# Patient Record
Sex: MALE | Race: WHITE | NOT HISPANIC OR LATINO | Employment: UNEMPLOYED | URBAN - METROPOLITAN AREA
[De-identification: names, ages, dates, MRNs, and addresses within clinical notes are randomized per-mention and may not be internally consistent; named-entity substitution may affect disease eponyms.]

---

## 2017-09-15 ENCOUNTER — RESOLUTE PROFESSIONAL BILLING HOSPITAL PROF FEE (OUTPATIENT)
Dept: HOSPITALIST | Facility: MEDICAL CENTER | Age: 37
End: 2017-09-15
Payer: COMMERCIAL

## 2017-09-15 ENCOUNTER — APPOINTMENT (OUTPATIENT)
Dept: RADIOLOGY | Facility: MEDICAL CENTER | Age: 37
DRG: 087 | End: 2017-09-15
Attending: EMERGENCY MEDICINE
Payer: COMMERCIAL

## 2017-09-15 ENCOUNTER — HOSPITAL ENCOUNTER (INPATIENT)
Facility: MEDICAL CENTER | Age: 37
LOS: 7 days | DRG: 087 | End: 2017-09-22
Attending: EMERGENCY MEDICINE | Admitting: SURGERY
Payer: COMMERCIAL

## 2017-09-15 ENCOUNTER — APPOINTMENT (OUTPATIENT)
Dept: RADIOLOGY | Facility: MEDICAL CENTER | Age: 37
DRG: 087 | End: 2017-09-15
Attending: SURGERY
Payer: COMMERCIAL

## 2017-09-15 DIAGNOSIS — S02.119A CLOSED FRACTURE OF RIGHT SIDE OF OCCIPITAL BONE, UNSPECIFIED OCCIPITAL FRACTURE TYPE, INITIAL ENCOUNTER (HCC): ICD-10-CM

## 2017-09-15 DIAGNOSIS — Z53.09 CONTRAINDICATION TO DEEP VEIN THROMBOSIS (DVT) PROPHYLAXIS: ICD-10-CM

## 2017-09-15 DIAGNOSIS — S06.5X1A TRAUMATIC SUBDURAL HEMATOMA WITH LOSS OF CONSCIOUSNESS OF 30 MINUTES OR LESS, INITIAL ENCOUNTER (HCC): ICD-10-CM

## 2017-09-15 PROBLEM — T14.90XA TRAUMA: Status: ACTIVE | Noted: 2017-09-15

## 2017-09-15 LAB
ABO GROUP BLD: NORMAL
ABO GROUP BLD: NORMAL
ALBUMIN SERPL BCP-MCNC: 4.4 G/DL (ref 3.2–4.9)
ALBUMIN/GLOB SERPL: 1.6 G/DL
ALP SERPL-CCNC: 56 U/L (ref 30–99)
ALT SERPL-CCNC: 21 U/L (ref 2–50)
ANION GAP SERPL CALC-SCNC: 13 MMOL/L (ref 0–11.9)
APTT PPP: 24.2 SEC (ref 24.7–36)
AST SERPL-CCNC: 26 U/L (ref 12–45)
BILIRUB SERPL-MCNC: 1.3 MG/DL (ref 0.1–1.5)
BLD GP AB SCN SERPL QL: NORMAL
BUN SERPL-MCNC: 13 MG/DL (ref 8–22)
CALCIUM SERPL-MCNC: 9.4 MG/DL (ref 8.5–10.5)
CHLORIDE SERPL-SCNC: 106 MMOL/L (ref 96–112)
CO2 SERPL-SCNC: 17 MMOL/L (ref 20–33)
CREAT SERPL-MCNC: 0.79 MG/DL (ref 0.5–1.4)
ERYTHROCYTE [DISTWIDTH] IN BLOOD BY AUTOMATED COUNT: 37.1 FL (ref 35.9–50)
ETHANOL BLD-MCNC: 0.01 G/DL
GFR SERPL CREATININE-BSD FRML MDRD: >60 ML/MIN/1.73 M 2
GLOBULIN SER CALC-MCNC: 2.7 G/DL (ref 1.9–3.5)
GLUCOSE BLD-MCNC: 131 MG/DL (ref 65–99)
GLUCOSE BLD-MCNC: 137 MG/DL (ref 65–99)
GLUCOSE SERPL-MCNC: 136 MG/DL (ref 65–99)
HCT VFR BLD AUTO: 42.3 % (ref 42–52)
HGB BLD-MCNC: 15 G/DL (ref 14–18)
INR PPP: 1.04 (ref 0.87–1.13)
MCH RBC QN AUTO: 28.7 PG (ref 27–33)
MCHC RBC AUTO-ENTMCNC: 35.5 G/DL (ref 33.7–35.3)
MCV RBC AUTO: 81 FL (ref 81.4–97.8)
PLATELET # BLD AUTO: 300 K/UL (ref 164–446)
PMV BLD AUTO: 9.8 FL (ref 9–12.9)
POTASSIUM SERPL-SCNC: 3.9 MMOL/L (ref 3.6–5.5)
PROT SERPL-MCNC: 7.1 G/DL (ref 6–8.2)
PROTHROMBIN TIME: 13.9 SEC (ref 12–14.6)
RBC # BLD AUTO: 5.22 M/UL (ref 4.7–6.1)
RH BLD: NORMAL
SODIUM SERPL-SCNC: 136 MMOL/L (ref 135–145)
WBC # BLD AUTO: 16.6 K/UL (ref 4.8–10.8)

## 2017-09-15 PROCEDURE — 86900 BLOOD TYPING SEROLOGIC ABO: CPT

## 2017-09-15 PROCEDURE — 96374 THER/PROPH/DIAG INJ IV PUSH: CPT

## 2017-09-15 PROCEDURE — 71010 DX-CHEST-LIMITED (1 VIEW): CPT

## 2017-09-15 PROCEDURE — 700102 HCHG RX REV CODE 250 W/ 637 OVERRIDE(OP): Performed by: SURGERY

## 2017-09-15 PROCEDURE — 85027 COMPLETE CBC AUTOMATED: CPT

## 2017-09-15 PROCEDURE — 700105 HCHG RX REV CODE 258: Performed by: SURGERY

## 2017-09-15 PROCEDURE — 86850 RBC ANTIBODY SCREEN: CPT

## 2017-09-15 PROCEDURE — 72125 CT NECK SPINE W/O DYE: CPT

## 2017-09-15 PROCEDURE — 82962 GLUCOSE BLOOD TEST: CPT

## 2017-09-15 PROCEDURE — 99291 CRITICAL CARE FIRST HOUR: CPT

## 2017-09-15 PROCEDURE — 700111 HCHG RX REV CODE 636 W/ 250 OVERRIDE (IP): Performed by: EMERGENCY MEDICINE

## 2017-09-15 PROCEDURE — 70486 CT MAXILLOFACIAL W/O DYE: CPT

## 2017-09-15 PROCEDURE — 86901 BLOOD TYPING SEROLOGIC RH(D): CPT

## 2017-09-15 PROCEDURE — 85730 THROMBOPLASTIN TIME PARTIAL: CPT

## 2017-09-15 PROCEDURE — 80307 DRUG TEST PRSMV CHEM ANLYZR: CPT

## 2017-09-15 PROCEDURE — 85610 PROTHROMBIN TIME: CPT

## 2017-09-15 PROCEDURE — 80053 COMPREHEN METABOLIC PANEL: CPT

## 2017-09-15 PROCEDURE — 700111 HCHG RX REV CODE 636 W/ 250 OVERRIDE (IP): Performed by: SURGERY

## 2017-09-15 PROCEDURE — G0390 TRAUMA RESPONS W/HOSP CRITI: HCPCS

## 2017-09-15 PROCEDURE — 70450 CT HEAD/BRAIN W/O DYE: CPT

## 2017-09-15 PROCEDURE — 770022 HCHG ROOM/CARE - ICU (200)

## 2017-09-15 PROCEDURE — 306565 RIGID MIT RESTRAINT(PAIR): Performed by: SURGERY

## 2017-09-15 PROCEDURE — A9270 NON-COVERED ITEM OR SERVICE: HCPCS | Performed by: SURGERY

## 2017-09-15 RX ORDER — DEXTROSE MONOHYDRATE 25 G/50ML
25 INJECTION, SOLUTION INTRAVENOUS
Status: DISCONTINUED | OUTPATIENT
Start: 2017-09-15 | End: 2017-09-17

## 2017-09-15 RX ORDER — BISACODYL 10 MG
10 SUPPOSITORY, RECTAL RECTAL
Status: DISCONTINUED | OUTPATIENT
Start: 2017-09-15 | End: 2017-09-22 | Stop reason: HOSPADM

## 2017-09-15 RX ORDER — ENEMA 19; 7 G/133ML; G/133ML
1 ENEMA RECTAL
Status: DISCONTINUED | OUTPATIENT
Start: 2017-09-15 | End: 2017-09-22 | Stop reason: HOSPADM

## 2017-09-15 RX ORDER — CHLORHEXIDINE GLUCONATE ORAL RINSE 1.2 MG/ML
15 SOLUTION DENTAL EVERY 12 HOURS
Status: DISCONTINUED | OUTPATIENT
Start: 2017-09-15 | End: 2017-09-15

## 2017-09-15 RX ORDER — LORAZEPAM 2 MG/ML
2 INJECTION INTRAMUSCULAR ONCE
Status: COMPLETED | OUTPATIENT
Start: 2017-09-15 | End: 2017-09-15

## 2017-09-15 RX ORDER — OXYCODONE HYDROCHLORIDE 10 MG/1
10 TABLET ORAL
Status: DISCONTINUED | OUTPATIENT
Start: 2017-09-15 | End: 2017-09-22 | Stop reason: HOSPADM

## 2017-09-15 RX ORDER — ONDANSETRON 2 MG/ML
4 INJECTION INTRAMUSCULAR; INTRAVENOUS EVERY 4 HOURS PRN
Status: DISCONTINUED | OUTPATIENT
Start: 2017-09-15 | End: 2017-09-17

## 2017-09-15 RX ORDER — SODIUM CHLORIDE, SODIUM LACTATE, POTASSIUM CHLORIDE, CALCIUM CHLORIDE 600; 310; 30; 20 MG/100ML; MG/100ML; MG/100ML; MG/100ML
INJECTION, SOLUTION INTRAVENOUS CONTINUOUS
Status: DISCONTINUED | OUTPATIENT
Start: 2017-09-15 | End: 2017-09-17

## 2017-09-15 RX ORDER — FAMOTIDINE 20 MG/1
20 TABLET, FILM COATED ORAL 2 TIMES DAILY
Status: DISCONTINUED | OUTPATIENT
Start: 2017-09-15 | End: 2017-09-17

## 2017-09-15 RX ORDER — AMOXICILLIN 250 MG
1 CAPSULE ORAL NIGHTLY
Status: DISCONTINUED | OUTPATIENT
Start: 2017-09-15 | End: 2017-09-22 | Stop reason: HOSPADM

## 2017-09-15 RX ORDER — ACETAMINOPHEN 500 MG
1000 TABLET ORAL EVERY 6 HOURS
Status: DISPENSED | OUTPATIENT
Start: 2017-09-15 | End: 2017-09-20

## 2017-09-15 RX ORDER — POLYETHYLENE GLYCOL 3350 17 G/17G
1 POWDER, FOR SOLUTION ORAL 2 TIMES DAILY
Status: DISCONTINUED | OUTPATIENT
Start: 2017-09-15 | End: 2017-09-22 | Stop reason: HOSPADM

## 2017-09-15 RX ORDER — HALOPERIDOL 5 MG/ML
5 INJECTION INTRAMUSCULAR ONCE
Status: COMPLETED | OUTPATIENT
Start: 2017-09-15 | End: 2017-09-15

## 2017-09-15 RX ORDER — DOCUSATE SODIUM 100 MG/1
100 CAPSULE, LIQUID FILLED ORAL 2 TIMES DAILY
Status: DISCONTINUED | OUTPATIENT
Start: 2017-09-15 | End: 2017-09-22 | Stop reason: HOSPADM

## 2017-09-15 RX ORDER — OXYCODONE HYDROCHLORIDE 10 MG/1
5 TABLET ORAL
Status: DISCONTINUED | OUTPATIENT
Start: 2017-09-15 | End: 2017-09-22 | Stop reason: HOSPADM

## 2017-09-15 RX ORDER — HALOPERIDOL 5 MG/ML
5 INJECTION INTRAMUSCULAR EVERY 4 HOURS PRN
Status: DISCONTINUED | OUTPATIENT
Start: 2017-09-15 | End: 2017-09-17

## 2017-09-15 RX ORDER — AMOXICILLIN 250 MG
1 CAPSULE ORAL
Status: DISCONTINUED | OUTPATIENT
Start: 2017-09-15 | End: 2017-09-22 | Stop reason: HOSPADM

## 2017-09-15 RX ADMIN — DEXTROSE MONOHYDRATE 500 MG: 50 INJECTION, SOLUTION INTRAVENOUS at 20:24

## 2017-09-15 RX ADMIN — SODIUM CHLORIDE, POTASSIUM CHLORIDE, SODIUM LACTATE AND CALCIUM CHLORIDE: 600; 310; 30; 20 INJECTION, SOLUTION INTRAVENOUS at 18:45

## 2017-09-15 RX ADMIN — HALOPERIDOL LACTATE 5 MG: 5 INJECTION, SOLUTION INTRAMUSCULAR at 21:48

## 2017-09-15 RX ADMIN — HALOPERIDOL LACTATE 5 MG: 5 INJECTION INTRAMUSCULAR at 23:44

## 2017-09-15 RX ADMIN — FAMOTIDINE 20 MG: 10 INJECTION, SOLUTION INTRAVENOUS at 20:24

## 2017-09-15 RX ADMIN — HALOPERIDOL LACTATE 5 MG: 5 INJECTION, SOLUTION INTRAMUSCULAR at 18:02

## 2017-09-15 RX ADMIN — ONDANSETRON 4 MG: 2 INJECTION INTRAMUSCULAR; INTRAVENOUS at 19:04

## 2017-09-15 RX ADMIN — OXYCODONE HYDROCHLORIDE 5 MG: 10 TABLET ORAL at 19:36

## 2017-09-15 RX ADMIN — LORAZEPAM 2 MG: 2 INJECTION INTRAMUSCULAR; INTRAVENOUS at 23:44

## 2017-09-15 ASSESSMENT — COPD QUESTIONNAIRES
DURING THE PAST 4 WEEKS HOW MUCH DID YOU FEEL SHORT OF BREATH: NONE/LITTLE OF THE TIME
COPD SCREENING SCORE: 2
HAVE YOU SMOKED AT LEAST 100 CIGARETTES IN YOUR ENTIRE LIFE: YES
DO YOU EVER COUGH UP ANY MUCUS OR PHLEGM?: NO/ONLY WITH OCCASIONAL COLDS OR INFECTIONS

## 2017-09-15 ASSESSMENT — LIFESTYLE VARIABLES: EVER_SMOKED: YES

## 2017-09-15 ASSESSMENT — PAIN SCALES - GENERAL
PAINLEVEL_OUTOF10: 0
PAINLEVEL_OUTOF10: 5
PAINLEVEL_OUTOF10: 2
PAINLEVEL_OUTOF10: 0

## 2017-09-16 ENCOUNTER — APPOINTMENT (OUTPATIENT)
Dept: RADIOLOGY | Facility: MEDICAL CENTER | Age: 37
DRG: 087 | End: 2017-09-16
Attending: SURGERY
Payer: COMMERCIAL

## 2017-09-16 ENCOUNTER — HOSPITAL ENCOUNTER (OUTPATIENT)
Dept: RADIOLOGY | Facility: MEDICAL CENTER | Age: 37
End: 2017-09-16
Attending: SURGERY

## 2017-09-16 ENCOUNTER — APPOINTMENT (OUTPATIENT)
Dept: RADIOLOGY | Facility: MEDICAL CENTER | Age: 37
DRG: 087 | End: 2017-09-16
Attending: NEUROLOGICAL SURGERY
Payer: COMMERCIAL

## 2017-09-16 LAB
ALBUMIN SERPL BCP-MCNC: 4.6 G/DL (ref 3.2–4.9)
ALBUMIN/GLOB SERPL: 1.5 G/DL
ALP SERPL-CCNC: 54 U/L (ref 30–99)
ALT SERPL-CCNC: 15 U/L (ref 2–50)
ANION GAP SERPL CALC-SCNC: 11 MMOL/L (ref 0–11.9)
AST SERPL-CCNC: 27 U/L (ref 12–45)
BASOPHILS # BLD AUTO: 0.2 % (ref 0–1.8)
BASOPHILS # BLD: 0.02 K/UL (ref 0–0.12)
BILIRUB SERPL-MCNC: 1.4 MG/DL (ref 0.1–1.5)
BUN SERPL-MCNC: 11 MG/DL (ref 8–22)
CALCIUM SERPL-MCNC: 9.2 MG/DL (ref 8.5–10.5)
CHLORIDE SERPL-SCNC: 101 MMOL/L (ref 96–112)
CO2 SERPL-SCNC: 23 MMOL/L (ref 20–33)
CREAT SERPL-MCNC: 0.82 MG/DL (ref 0.5–1.4)
EOSINOPHIL # BLD AUTO: 0 K/UL (ref 0–0.51)
EOSINOPHIL NFR BLD: 0 % (ref 0–6.9)
ERYTHROCYTE [DISTWIDTH] IN BLOOD BY AUTOMATED COUNT: 37.2 FL (ref 35.9–50)
GFR SERPL CREATININE-BSD FRML MDRD: >60 ML/MIN/1.73 M 2
GLOBULIN SER CALC-MCNC: 3 G/DL (ref 1.9–3.5)
GLUCOSE BLD-MCNC: 127 MG/DL (ref 65–99)
GLUCOSE BLD-MCNC: 128 MG/DL (ref 65–99)
GLUCOSE BLD-MCNC: 148 MG/DL (ref 65–99)
GLUCOSE SERPL-MCNC: 147 MG/DL (ref 65–99)
HCT VFR BLD AUTO: 41.3 % (ref 42–52)
HGB BLD-MCNC: 14.5 G/DL (ref 14–18)
IMM GRANULOCYTES # BLD AUTO: 0.03 K/UL (ref 0–0.11)
IMM GRANULOCYTES NFR BLD AUTO: 0.3 % (ref 0–0.9)
LYMPHOCYTES # BLD AUTO: 0.54 K/UL (ref 1–4.8)
LYMPHOCYTES NFR BLD: 5.8 % (ref 22–41)
MCH RBC QN AUTO: 28.5 PG (ref 27–33)
MCHC RBC AUTO-ENTMCNC: 35.1 G/DL (ref 33.7–35.3)
MCV RBC AUTO: 81.1 FL (ref 81.4–97.8)
MONOCYTES # BLD AUTO: 0.36 K/UL (ref 0–0.85)
MONOCYTES NFR BLD AUTO: 3.9 % (ref 0–13.4)
NEUTROPHILS # BLD AUTO: 8.34 K/UL (ref 1.82–7.42)
NEUTROPHILS NFR BLD: 89.8 % (ref 44–72)
NRBC # BLD AUTO: 0 K/UL
NRBC BLD AUTO-RTO: 0 /100 WBC
PLATELET # BLD AUTO: 257 K/UL (ref 164–446)
PMV BLD AUTO: 9.7 FL (ref 9–12.9)
POTASSIUM SERPL-SCNC: 4.1 MMOL/L (ref 3.6–5.5)
PROT SERPL-MCNC: 7.6 G/DL (ref 6–8.2)
RBC # BLD AUTO: 5.09 M/UL (ref 4.7–6.1)
SODIUM SERPL-SCNC: 135 MMOL/L (ref 135–145)
WBC # BLD AUTO: 9.3 K/UL (ref 4.8–10.8)

## 2017-09-16 PROCEDURE — 51798 US URINE CAPACITY MEASURE: CPT

## 2017-09-16 PROCEDURE — 700105 HCHG RX REV CODE 258: Performed by: SURGERY

## 2017-09-16 PROCEDURE — A9270 NON-COVERED ITEM OR SERVICE: HCPCS | Performed by: SURGERY

## 2017-09-16 PROCEDURE — 70498 CT ANGIOGRAPHY NECK: CPT

## 2017-09-16 PROCEDURE — 700111 HCHG RX REV CODE 636 W/ 250 OVERRIDE (IP): Performed by: SURGERY

## 2017-09-16 PROCEDURE — 82962 GLUCOSE BLOOD TEST: CPT | Mod: 91

## 2017-09-16 PROCEDURE — 99233 SBSQ HOSP IP/OBS HIGH 50: CPT | Performed by: SURGERY

## 2017-09-16 PROCEDURE — 70450 CT HEAD/BRAIN W/O DYE: CPT

## 2017-09-16 PROCEDURE — 700117 HCHG RX CONTRAST REV CODE 255: Performed by: SURGERY

## 2017-09-16 PROCEDURE — 80053 COMPREHEN METABOLIC PANEL: CPT

## 2017-09-16 PROCEDURE — 770022 HCHG ROOM/CARE - ICU (200)

## 2017-09-16 PROCEDURE — 700102 HCHG RX REV CODE 250 W/ 637 OVERRIDE(OP): Performed by: SURGERY

## 2017-09-16 PROCEDURE — 85025 COMPLETE CBC W/AUTO DIFF WBC: CPT

## 2017-09-16 RX ADMIN — DEXTROSE MONOHYDRATE 500 MG: 50 INJECTION, SOLUTION INTRAVENOUS at 08:37

## 2017-09-16 RX ADMIN — ACETAMINOPHEN 1000 MG: 500 TABLET ORAL at 23:43

## 2017-09-16 RX ADMIN — ONDANSETRON 4 MG: 2 INJECTION INTRAMUSCULAR; INTRAVENOUS at 10:57

## 2017-09-16 RX ADMIN — ACETAMINOPHEN 1000 MG: 500 TABLET ORAL at 18:05

## 2017-09-16 RX ADMIN — FAMOTIDINE 20 MG: 10 INJECTION, SOLUTION INTRAVENOUS at 08:37

## 2017-09-16 RX ADMIN — HALOPERIDOL LACTATE 5 MG: 5 INJECTION, SOLUTION INTRAMUSCULAR at 23:49

## 2017-09-16 RX ADMIN — FENTANYL CITRATE 50 MCG: 50 INJECTION, SOLUTION INTRAMUSCULAR; INTRAVENOUS at 02:46

## 2017-09-16 RX ADMIN — HALOPERIDOL LACTATE 5 MG: 5 INJECTION, SOLUTION INTRAMUSCULAR at 04:33

## 2017-09-16 RX ADMIN — ONDANSETRON 4 MG: 2 INJECTION INTRAMUSCULAR; INTRAVENOUS at 05:24

## 2017-09-16 RX ADMIN — DEXTROSE MONOHYDRATE 500 MG: 50 INJECTION, SOLUTION INTRAVENOUS at 20:50

## 2017-09-16 RX ADMIN — FAMOTIDINE 20 MG: 10 INJECTION, SOLUTION INTRAVENOUS at 20:50

## 2017-09-16 RX ADMIN — FENTANYL CITRATE 50 MCG: 50 INJECTION, SOLUTION INTRAMUSCULAR; INTRAVENOUS at 23:53

## 2017-09-16 RX ADMIN — ACETAMINOPHEN 1000 MG: 500 TABLET ORAL at 11:19

## 2017-09-16 RX ADMIN — IOHEXOL 100 ML: 350 INJECTION, SOLUTION INTRAVENOUS at 00:18

## 2017-09-16 RX ADMIN — FENTANYL CITRATE 50 MCG: 50 INJECTION, SOLUTION INTRAMUSCULAR; INTRAVENOUS at 22:16

## 2017-09-16 ASSESSMENT — LIFESTYLE VARIABLES: DO YOU DRINK ALCOHOL: NO

## 2017-09-16 ASSESSMENT — PAIN SCALES - GENERAL
PAINLEVEL_OUTOF10: 0

## 2017-09-16 NOTE — PROGRESS NOTES
Dr. White at bedside to examine patient. Patient neurologically intact, having some emesis, zofran given per MAR. Dr. White cleared patient to have C collar removed and sit up.
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
## 2017-09-16 NOTE — PROGRESS NOTES
Patient picked up from ER at 1840 with CCT on monitor with ACLS RN and 2 correctional officers. Patient A&Ox 3, no complaints of pain. Patient restless, received haldol prior to transport. C spine precautions utilized on transfer, skin intact
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
## 2017-09-16 NOTE — CARE PLAN
Problem: Infection  Goal: Will remain free from infection    Intervention: Assess signs and symptoms of infection  Patient at risk for infection due to invasive LDA's present. Assessed for s/s infection, none present at this time. Will continue to monitor for infection risk      Problem: Knowledge Deficit  Goal: Knowledge of disease process/condition, treatment plan, diagnostic tests, and medications will improve    Intervention: Assess knowledge level of disease process/condition, treatment plan, diagnostic tests, and medications  Patient educated on plan of care and updated about procedures and tests planned

## 2017-09-16 NOTE — CARE PLAN
Problem: Safety  Goal: Will remain free from injury  Restraints in place per correction officers, bed alarm on, bed in low locked position.    Problem: Pain Management  Goal: Pain level will decrease to patient's comfort goal  Assess pts pain level and treat as needed.
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
## 2017-09-17 ENCOUNTER — APPOINTMENT (OUTPATIENT)
Dept: RADIOLOGY | Facility: MEDICAL CENTER | Age: 37
DRG: 087 | End: 2017-09-17
Attending: NEUROLOGICAL SURGERY
Payer: COMMERCIAL

## 2017-09-17 PROBLEM — Z53.09 CONTRAINDICATION TO DEEP VEIN THROMBOSIS (DVT) PROPHYLAXIS: Status: ACTIVE | Noted: 2017-09-17

## 2017-09-17 LAB
ANION GAP SERPL CALC-SCNC: 8 MMOL/L (ref 0–11.9)
BASOPHILS # BLD AUTO: 0.4 % (ref 0–1.8)
BASOPHILS # BLD: 0.03 K/UL (ref 0–0.12)
BUN SERPL-MCNC: 9 MG/DL (ref 8–22)
CALCIUM SERPL-MCNC: 8.9 MG/DL (ref 8.5–10.5)
CHLORIDE SERPL-SCNC: 105 MMOL/L (ref 96–112)
CO2 SERPL-SCNC: 25 MMOL/L (ref 20–33)
CREAT SERPL-MCNC: 0.66 MG/DL (ref 0.5–1.4)
EOSINOPHIL # BLD AUTO: 0.02 K/UL (ref 0–0.51)
EOSINOPHIL NFR BLD: 0.3 % (ref 0–6.9)
ERYTHROCYTE [DISTWIDTH] IN BLOOD BY AUTOMATED COUNT: 37.1 FL (ref 35.9–50)
GFR SERPL CREATININE-BSD FRML MDRD: >60 ML/MIN/1.73 M 2
GLUCOSE SERPL-MCNC: 112 MG/DL (ref 65–99)
HCT VFR BLD AUTO: 39.1 % (ref 42–52)
HGB BLD-MCNC: 13.7 G/DL (ref 14–18)
IMM GRANULOCYTES # BLD AUTO: 0.03 K/UL (ref 0–0.11)
IMM GRANULOCYTES NFR BLD AUTO: 0.4 % (ref 0–0.9)
LYMPHOCYTES # BLD AUTO: 1.19 K/UL (ref 1–4.8)
LYMPHOCYTES NFR BLD: 15.6 % (ref 22–41)
MCH RBC QN AUTO: 28.5 PG (ref 27–33)
MCHC RBC AUTO-ENTMCNC: 35 G/DL (ref 33.7–35.3)
MCV RBC AUTO: 81.3 FL (ref 81.4–97.8)
MONOCYTES # BLD AUTO: 0.54 K/UL (ref 0–0.85)
MONOCYTES NFR BLD AUTO: 7.1 % (ref 0–13.4)
NEUTROPHILS # BLD AUTO: 5.81 K/UL (ref 1.82–7.42)
NEUTROPHILS NFR BLD: 76.2 % (ref 44–72)
NRBC # BLD AUTO: 0 K/UL
NRBC BLD AUTO-RTO: 0 /100 WBC
PLATELET # BLD AUTO: 223 K/UL (ref 164–446)
PMV BLD AUTO: 9.4 FL (ref 9–12.9)
POTASSIUM SERPL-SCNC: 3.7 MMOL/L (ref 3.6–5.5)
RBC # BLD AUTO: 4.81 M/UL (ref 4.7–6.1)
SODIUM SERPL-SCNC: 138 MMOL/L (ref 135–145)
WBC # BLD AUTO: 7.6 K/UL (ref 4.8–10.8)

## 2017-09-17 PROCEDURE — 99233 SBSQ HOSP IP/OBS HIGH 50: CPT | Performed by: SURGERY

## 2017-09-17 PROCEDURE — 700111 HCHG RX REV CODE 636 W/ 250 OVERRIDE (IP): Performed by: SURGERY

## 2017-09-17 PROCEDURE — 700102 HCHG RX REV CODE 250 W/ 637 OVERRIDE(OP): Performed by: NURSE PRACTITIONER

## 2017-09-17 PROCEDURE — 770001 HCHG ROOM/CARE - MED/SURG/GYN PRIV*

## 2017-09-17 PROCEDURE — A9270 NON-COVERED ITEM OR SERVICE: HCPCS | Performed by: SURGERY

## 2017-09-17 PROCEDURE — 80048 BASIC METABOLIC PNL TOTAL CA: CPT

## 2017-09-17 PROCEDURE — 85025 COMPLETE CBC W/AUTO DIFF WBC: CPT

## 2017-09-17 PROCEDURE — 70450 CT HEAD/BRAIN W/O DYE: CPT

## 2017-09-17 PROCEDURE — 700102 HCHG RX REV CODE 250 W/ 637 OVERRIDE(OP): Performed by: SURGERY

## 2017-09-17 PROCEDURE — 700112 HCHG RX REV CODE 229: Performed by: SURGERY

## 2017-09-17 PROCEDURE — A9270 NON-COVERED ITEM OR SERVICE: HCPCS | Performed by: NURSE PRACTITIONER

## 2017-09-17 PROCEDURE — 700105 HCHG RX REV CODE 258: Performed by: SURGERY

## 2017-09-17 RX ORDER — LEVETIRACETAM 250 MG/1
500 TABLET ORAL 2 TIMES DAILY
Status: COMPLETED | OUTPATIENT
Start: 2017-09-17 | End: 2017-09-22

## 2017-09-17 RX ORDER — ONDANSETRON 4 MG/1
4 TABLET, ORALLY DISINTEGRATING ORAL EVERY 4 HOURS PRN
Status: DISCONTINUED | OUTPATIENT
Start: 2017-09-17 | End: 2017-09-22 | Stop reason: HOSPADM

## 2017-09-17 RX ADMIN — FENTANYL CITRATE 50 MCG: 50 INJECTION, SOLUTION INTRAMUSCULAR; INTRAVENOUS at 05:50

## 2017-09-17 RX ADMIN — FAMOTIDINE 20 MG: 20 TABLET, FILM COATED ORAL at 08:49

## 2017-09-17 RX ADMIN — DEXTROSE MONOHYDRATE 500 MG: 50 INJECTION, SOLUTION INTRAVENOUS at 09:07

## 2017-09-17 RX ADMIN — ACETAMINOPHEN 1000 MG: 500 TABLET ORAL at 17:45

## 2017-09-17 RX ADMIN — LEVETIRACETAM 500 MG: 250 TABLET, FILM COATED ORAL at 20:56

## 2017-09-17 RX ADMIN — ACETAMINOPHEN 1000 MG: 500 TABLET ORAL at 11:15

## 2017-09-17 RX ADMIN — DOCUSATE SODIUM 100 MG: 100 CAPSULE ORAL at 08:49

## 2017-09-17 RX ADMIN — OXYCODONE HYDROCHLORIDE 10 MG: 10 TABLET ORAL at 17:45

## 2017-09-17 RX ADMIN — HALOPERIDOL LACTATE 5 MG: 5 INJECTION, SOLUTION INTRAMUSCULAR at 03:08

## 2017-09-17 ASSESSMENT — ENCOUNTER SYMPTOMS
CHILLS: 0
NECK PAIN: 0
VOMITING: 0
FOCAL WEAKNESS: 0
SHORTNESS OF BREATH: 0
SPEECH CHANGE: 0
HEADACHES: 0
DOUBLE VISION: 0
ABDOMINAL PAIN: 0
NAUSEA: 0
BACK PAIN: 0
FEVER: 0
SENSORY CHANGE: 0

## 2017-09-17 ASSESSMENT — PAIN SCALES - GENERAL
PAINLEVEL_OUTOF10: 0
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
## 2017-09-17 NOTE — PROGRESS NOTES
Received pt from ICU at 1200.  No changes from ICU assessment.  Bed in lowest position, call light within reach.  Correctional officers at bedside at all times.
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
## 2017-09-17 NOTE — CARE PLAN
Problem: Safety  Goal: Will remain free from injury  Bed in low locked position, bed alarm on. Pt in shackles per correction officers.    Problem: Pain Management  Goal: Pain level will decrease to patient's comfort goal  Assess pts pain level and treat as needed.
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
## 2017-09-18 LAB
ANION GAP SERPL CALC-SCNC: 11 MMOL/L (ref 0–11.9)
BASOPHILS # BLD AUTO: 0.3 % (ref 0–1.8)
BASOPHILS # BLD: 0.02 K/UL (ref 0–0.12)
BUN SERPL-MCNC: 11 MG/DL (ref 8–22)
CALCIUM SERPL-MCNC: 9.6 MG/DL (ref 8.5–10.5)
CHLORIDE SERPL-SCNC: 101 MMOL/L (ref 96–112)
CO2 SERPL-SCNC: 26 MMOL/L (ref 20–33)
CREAT SERPL-MCNC: 0.58 MG/DL (ref 0.5–1.4)
EOSINOPHIL # BLD AUTO: 0 K/UL (ref 0–0.51)
EOSINOPHIL NFR BLD: 0 % (ref 0–6.9)
ERYTHROCYTE [DISTWIDTH] IN BLOOD BY AUTOMATED COUNT: 37 FL (ref 35.9–50)
GFR SERPL CREATININE-BSD FRML MDRD: >60 ML/MIN/1.73 M 2
GLUCOSE SERPL-MCNC: 109 MG/DL (ref 65–99)
HCT VFR BLD AUTO: 40.2 % (ref 42–52)
HGB BLD-MCNC: 14.1 G/DL (ref 14–18)
IMM GRANULOCYTES # BLD AUTO: 0.04 K/UL (ref 0–0.11)
IMM GRANULOCYTES NFR BLD AUTO: 0.5 % (ref 0–0.9)
LYMPHOCYTES # BLD AUTO: 1.23 K/UL (ref 1–4.8)
LYMPHOCYTES NFR BLD: 15.5 % (ref 22–41)
MAGNESIUM SERPL-MCNC: 2 MG/DL (ref 1.5–2.5)
MCH RBC QN AUTO: 28.6 PG (ref 27–33)
MCHC RBC AUTO-ENTMCNC: 35.1 G/DL (ref 33.7–35.3)
MCV RBC AUTO: 81.5 FL (ref 81.4–97.8)
MONOCYTES # BLD AUTO: 0.52 K/UL (ref 0–0.85)
MONOCYTES NFR BLD AUTO: 6.5 % (ref 0–13.4)
NEUTROPHILS # BLD AUTO: 6.13 K/UL (ref 1.82–7.42)
NEUTROPHILS NFR BLD: 77.2 % (ref 44–72)
NRBC # BLD AUTO: 0 K/UL
NRBC BLD AUTO-RTO: 0 /100 WBC
PHOSPHATE SERPL-MCNC: 2.5 MG/DL (ref 2.5–4.5)
PLATELET # BLD AUTO: 269 K/UL (ref 164–446)
PMV BLD AUTO: 10.4 FL (ref 9–12.9)
POTASSIUM SERPL-SCNC: 3.5 MMOL/L (ref 3.6–5.5)
RBC # BLD AUTO: 4.93 M/UL (ref 4.7–6.1)
SODIUM SERPL-SCNC: 138 MMOL/L (ref 135–145)
WBC # BLD AUTO: 7.9 K/UL (ref 4.8–10.8)

## 2017-09-18 PROCEDURE — G9165 ATTEN CURRENT STATUS: HCPCS | Mod: CJ

## 2017-09-18 PROCEDURE — 700102 HCHG RX REV CODE 250 W/ 637 OVERRIDE(OP): Performed by: NURSE PRACTITIONER

## 2017-09-18 PROCEDURE — 85025 COMPLETE CBC W/AUTO DIFF WBC: CPT

## 2017-09-18 PROCEDURE — 770001 HCHG ROOM/CARE - MED/SURG/GYN PRIV*

## 2017-09-18 PROCEDURE — A9270 NON-COVERED ITEM OR SERVICE: HCPCS | Performed by: SURGERY

## 2017-09-18 PROCEDURE — 83735 ASSAY OF MAGNESIUM: CPT

## 2017-09-18 PROCEDURE — 80048 BASIC METABOLIC PNL TOTAL CA: CPT

## 2017-09-18 PROCEDURE — 92523 SPEECH SOUND LANG COMPREHEN: CPT

## 2017-09-18 PROCEDURE — 93971 EXTREMITY STUDY: CPT | Mod: 26 | Performed by: SURGERY

## 2017-09-18 PROCEDURE — A9270 NON-COVERED ITEM OR SERVICE: HCPCS | Performed by: NURSE PRACTITIONER

## 2017-09-18 PROCEDURE — 700112 HCHG RX REV CODE 229: Performed by: SURGERY

## 2017-09-18 PROCEDURE — 700102 HCHG RX REV CODE 250 W/ 637 OVERRIDE(OP): Performed by: SURGERY

## 2017-09-18 PROCEDURE — 93971 EXTREMITY STUDY: CPT

## 2017-09-18 PROCEDURE — 36415 COLL VENOUS BLD VENIPUNCTURE: CPT

## 2017-09-18 PROCEDURE — G9166 ATTEN GOAL STATUS: HCPCS | Mod: CI

## 2017-09-18 PROCEDURE — 84100 ASSAY OF PHOSPHORUS: CPT

## 2017-09-18 RX ORDER — POTASSIUM CHLORIDE 20 MEQ/1
40 TABLET, EXTENDED RELEASE ORAL ONCE
Status: COMPLETED | OUTPATIENT
Start: 2017-09-18 | End: 2017-09-18

## 2017-09-18 RX ADMIN — DOCUSATE SODIUM 100 MG: 100 CAPSULE ORAL at 20:08

## 2017-09-18 RX ADMIN — POLYETHYLENE GLYCOL 3350 1 PACKET: 17 POWDER, FOR SOLUTION ORAL at 20:08

## 2017-09-18 RX ADMIN — POLYETHYLENE GLYCOL 3350 1 PACKET: 17 POWDER, FOR SOLUTION ORAL at 08:36

## 2017-09-18 RX ADMIN — MAGNESIUM HYDROXIDE 30 ML: 400 SUSPENSION ORAL at 08:36

## 2017-09-18 RX ADMIN — DOCUSATE SODIUM 100 MG: 100 CAPSULE ORAL at 08:36

## 2017-09-18 RX ADMIN — STANDARDIZED SENNA CONCENTRATE AND DOCUSATE SODIUM 1 TABLET: 8.6; 5 TABLET, FILM COATED ORAL at 20:08

## 2017-09-18 RX ADMIN — OXYCODONE HYDROCHLORIDE 10 MG: 10 TABLET ORAL at 08:36

## 2017-09-18 RX ADMIN — ACETAMINOPHEN 1000 MG: 500 TABLET ORAL at 01:15

## 2017-09-18 RX ADMIN — ACETAMINOPHEN 1000 MG: 500 TABLET ORAL at 06:29

## 2017-09-18 RX ADMIN — LEVETIRACETAM 500 MG: 250 TABLET, FILM COATED ORAL at 08:36

## 2017-09-18 RX ADMIN — ACETAMINOPHEN 1000 MG: 500 TABLET ORAL at 13:08

## 2017-09-18 RX ADMIN — POTASSIUM CHLORIDE 40 MEQ: 1500 TABLET, EXTENDED RELEASE ORAL at 08:36

## 2017-09-18 RX ADMIN — LEVETIRACETAM 500 MG: 250 TABLET, FILM COATED ORAL at 21:00

## 2017-09-18 RX ADMIN — ACETAMINOPHEN 1000 MG: 500 TABLET ORAL at 17:13

## 2017-09-18 RX ADMIN — OXYCODONE HYDROCHLORIDE 10 MG: 10 TABLET ORAL at 17:13

## 2017-09-18 ASSESSMENT — ENCOUNTER SYMPTOMS
PALPITATIONS: 0
MUSCULOSKELETAL NEGATIVE: 1
HEADACHES: 1
LOSS OF CONSCIOUSNESS: 0
SHORTNESS OF BREATH: 0
ROS GI COMMENTS: BM PRIOR TO ARRIVAL
RESPIRATORY NEGATIVE: 1
CARDIOVASCULAR NEGATIVE: 1
VOMITING: 0
SPEECH CHANGE: 0
DOUBLE VISION: 0
HEADACHES: 0
NAUSEA: 0
FOCAL WEAKNESS: 0
PSYCHIATRIC NEGATIVE: 1
SEIZURES: 0
CONSTITUTIONAL NEGATIVE: 1
SENSORY CHANGE: 0
BLURRED VISION: 0
FEVER: 0
CHILLS: 0

## 2017-09-18 ASSESSMENT — PAIN SCALES - GENERAL
PAINLEVEL_OUTOF10: 8
PAINLEVEL_OUTOF10: 4
PAINLEVEL_OUTOF10: 7
PAINLEVEL_OUTOF10: 2
PAINLEVEL_OUTOF10: 3

## 2017-09-18 ASSESSMENT — PATIENT HEALTH QUESTIONNAIRE - PHQ9
SUM OF ALL RESPONSES TO PHQ9 QUESTIONS 1 AND 2: 0
SUM OF ALL RESPONSES TO PHQ QUESTIONS 1-9: 0
2. FEELING DOWN, DEPRESSED, IRRITABLE, OR HOPELESS: NOT AT ALL
1. LITTLE INTEREST OR PLEASURE IN DOING THINGS: NOT AT ALL
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
## 2017-09-18 NOTE — DISCHARGE PLANNING
Medical Social Worker    JHONY received call from [facility] JAMESON Cao. JHONY was asking for updated clinicals. JHONY asked when the last time she received them was, she stated yesterday. JHONY informed usually [facility] faxed clinicals every two days but would still inform [facility] anyway.     Kiley SWAIN for [facility]  Phone: [phone] ext [phone]  FAX: [phone]    Please contact this number when pt is ready for DC

## 2017-09-18 NOTE — THERAPY
"Speech Language Therapy Evaluation completed to address speech and cognition  Functional Status:  The patient was sleeping but woke to name. However, he appeared lethargic and had difficulty keeping his eyes open during the evaluation. Pain was reported at 1/10 and meds given ~3 hours ago.  His responses to questions were appropriate but mildly delayed with sluggish speech. Patient required min cues to enunciate. Overall, he presents with dysarthria, mild deficits in attention, short term memory, and problem solving.    Recommendations:  Consider PT and/or OT evaluation for further assessment prior to discharge.  Plan of Care: Will benefit from Speech Therapy 3 times per week  Post-Acute Therapy: Discharge to a transitional care facility for continued skilled therapy services. and Discharge to home with outpatient or home health for additional skilled therapy services.    See \"Rehab Therapy-Acute\" Patient Summary Report for complete documentation.   "

## 2017-09-18 NOTE — CARE PLAN
Problem: Safety  Goal: Will remain free from falls  Outcome: PROGRESSING SLOWER THAN EXPECTED  Pt OOB with one person assist today.  He tends to want to sleep and to refuse mobilization, but RN insisted.  He was able to mobilize, with shackles on from bed to toilet.  Slightly unsteady gait, and somewhat impulsive.  RN rounding on pt q 2 hours.  No s/s of neuro changes and he benefits from being oOOB for meals.  He is able to feed himself safely and continues to oreitned x3, somewhat forgetful to situation, throughout the day.    Problem: Pain Management  Goal: Pain level will decrease to patient's comfort goal  Outcome: PROGRESSING AS EXPECTED  C/O 7/10 pain in morning, but pain well-controlled with oxycodone.  Holding some doses as pt able to sleep comfortable and soundly througout afternoon.
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
## 2017-09-19 LAB
ANION GAP SERPL CALC-SCNC: 8 MMOL/L (ref 0–11.9)
BUN SERPL-MCNC: 8 MG/DL (ref 8–22)
CALCIUM SERPL-MCNC: 9.3 MG/DL (ref 8.5–10.5)
CHLORIDE SERPL-SCNC: 99 MMOL/L (ref 96–112)
CO2 SERPL-SCNC: 28 MMOL/L (ref 20–33)
CREAT SERPL-MCNC: 0.56 MG/DL (ref 0.5–1.4)
GFR SERPL CREATININE-BSD FRML MDRD: >60 ML/MIN/1.73 M 2
GLUCOSE SERPL-MCNC: 111 MG/DL (ref 65–99)
POTASSIUM SERPL-SCNC: 3.5 MMOL/L (ref 3.6–5.5)
SODIUM SERPL-SCNC: 135 MMOL/L (ref 135–145)

## 2017-09-19 PROCEDURE — 36415 COLL VENOUS BLD VENIPUNCTURE: CPT

## 2017-09-19 PROCEDURE — G8978 MOBILITY CURRENT STATUS: HCPCS | Mod: CJ

## 2017-09-19 PROCEDURE — 770001 HCHG ROOM/CARE - MED/SURG/GYN PRIV*

## 2017-09-19 PROCEDURE — 700102 HCHG RX REV CODE 250 W/ 637 OVERRIDE(OP): Performed by: NURSE PRACTITIONER

## 2017-09-19 PROCEDURE — A9270 NON-COVERED ITEM OR SERVICE: HCPCS | Performed by: SURGERY

## 2017-09-19 PROCEDURE — 700102 HCHG RX REV CODE 250 W/ 637 OVERRIDE(OP): Performed by: SURGERY

## 2017-09-19 PROCEDURE — G8979 MOBILITY GOAL STATUS: HCPCS | Mod: CI

## 2017-09-19 PROCEDURE — G8980 MOBILITY D/C STATUS: HCPCS | Mod: CI

## 2017-09-19 PROCEDURE — A9270 NON-COVERED ITEM OR SERVICE: HCPCS | Performed by: NURSE PRACTITIONER

## 2017-09-19 PROCEDURE — 80048 BASIC METABOLIC PNL TOTAL CA: CPT

## 2017-09-19 PROCEDURE — 97161 PT EVAL LOW COMPLEX 20 MIN: CPT

## 2017-09-19 PROCEDURE — 700112 HCHG RX REV CODE 229: Performed by: SURGERY

## 2017-09-19 RX ADMIN — ACETAMINOPHEN 1000 MG: 500 TABLET ORAL at 23:57

## 2017-09-19 RX ADMIN — ACETAMINOPHEN 1000 MG: 500 TABLET ORAL at 07:24

## 2017-09-19 RX ADMIN — ACETAMINOPHEN 1000 MG: 500 TABLET ORAL at 12:00

## 2017-09-19 RX ADMIN — ACETAMINOPHEN 1000 MG: 500 TABLET ORAL at 17:44

## 2017-09-19 RX ADMIN — LEVETIRACETAM 500 MG: 250 TABLET, FILM COATED ORAL at 20:22

## 2017-09-19 RX ADMIN — MAGNESIUM HYDROXIDE 30 ML: 400 SUSPENSION ORAL at 10:01

## 2017-09-19 RX ADMIN — STANDARDIZED SENNA CONCENTRATE AND DOCUSATE SODIUM 1 TABLET: 8.6; 5 TABLET, FILM COATED ORAL at 20:22

## 2017-09-19 RX ADMIN — DOCUSATE SODIUM 100 MG: 100 CAPSULE ORAL at 20:23

## 2017-09-19 RX ADMIN — LEVETIRACETAM 500 MG: 250 TABLET, FILM COATED ORAL at 10:01

## 2017-09-19 RX ADMIN — OXYCODONE HYDROCHLORIDE 5 MG: 10 TABLET ORAL at 15:03

## 2017-09-19 RX ADMIN — POLYETHYLENE GLYCOL 3350 1 PACKET: 17 POWDER, FOR SOLUTION ORAL at 20:22

## 2017-09-19 RX ADMIN — DOCUSATE SODIUM 100 MG: 100 CAPSULE ORAL at 10:01

## 2017-09-19 ASSESSMENT — COGNITIVE AND FUNCTIONAL STATUS - GENERAL
MOVING TO AND FROM BED TO CHAIR: A LITTLE
MOBILITY SCORE: 20
MOVING FROM LYING ON BACK TO SITTING ON SIDE OF FLAT BED: A LITTLE
CLIMB 3 TO 5 STEPS WITH RAILING: A LITTLE
SUGGESTED CMS G CODE MODIFIER MOBILITY: CJ
WALKING IN HOSPITAL ROOM: A LITTLE

## 2017-09-19 ASSESSMENT — ENCOUNTER SYMPTOMS
CARDIOVASCULAR NEGATIVE: 1
PSYCHIATRIC NEGATIVE: 1
HEADACHES: 1
SPEECH CHANGE: 0
ROS GI COMMENTS: BM PRIOR TO ARRIVAL
DOUBLE VISION: 0
FOCAL WEAKNESS: 0
SENSORY CHANGE: 0
CONSTITUTIONAL NEGATIVE: 1
MUSCULOSKELETAL NEGATIVE: 1
RESPIRATORY NEGATIVE: 1

## 2017-09-19 ASSESSMENT — GAIT ASSESSMENTS
GAIT LEVEL OF ASSIST: STAND BY ASSIST
DISTANCE (FEET): 4

## 2017-09-19 ASSESSMENT — PAIN SCALES - GENERAL
PAINLEVEL_OUTOF10: 0
PAINLEVEL_OUTOF10: 3
PAINLEVEL_OUTOF10: 0
PAINLEVEL_OUTOF10: 2
PAINLEVEL_OUTOF10: 2
PAINLEVEL_OUTOF10: 3
PAINLEVEL_OUTOF10: 0

## 2017-09-19 ASSESSMENT — LIFESTYLE VARIABLES: DO YOU DRINK ALCOHOL: NO

## 2017-09-19 NOTE — PROGRESS NOTES
"Pt resting in bed, drowsy, Ox3, PERRLA, moves all extremities, no drifts noted, describes 2/10 right head pain at this time, declines numbness, tingling, SOB, CP and lightheadedness. Pt does not recall when last BM was, denies constipation and states he has ate very little these past couple days. Pt refusing to mobilize and requesting to sleep, RN educated pt importance of ambulation, pt still refuses. No s/s of distress, call light within reach, guards at bedside, will continue to monitor.     /62   Pulse 62   Temp 36.8 °C (98.3 °F)   Resp 16   Ht 1.753 m (5' 9\")   Wt 85.5 kg (188 lb 7.9 oz)   SpO2 93%   BMI 27.84 kg/m²     "
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
## 2017-09-19 NOTE — PROGRESS NOTES
Pt. A/Ox4, agitated this morning, officers at bedside. Shackles in place. No IV. No pain. Neuro check intact. SCDs in place. Poc discussed, bed alarm on.

## 2017-09-19 NOTE — PROGRESS NOTES
Bed alarm is in place with 2 guards at . Patient has been educated regarding bed alarm as a weight sensor. Patient is continually setting off bed alarm despite education. Patient asked RN to room because he said he needed to urinate and wanted to go to the bathroom. I told the patient that he would need to use the urinal for elimination as he fell this morning and it is in his best interest for his safety. Patient insisted he cannot use the urinal. Guards at  asked if we would like him to stay in bed. I told them that we want the patient to stay in bed at all costs as he is a large risk for injury with falls. Guards verbalized understanding. As I was leaving the room the PT screamed many obscenities at me. I was informed by guards at this point that the patient is upset that he is close to discharge and is trying to do what he can to stay.

## 2017-09-19 NOTE — CARE PLAN
Problem: Safety  Goal: Will remain free from injury    Intervention: Collaborate with Interdisciplinary Team for safe transfer and mobilization techniques  Communicated increased patient needs with staff.  Patient educated about safety and fall precaution.  Discussed patient's fall risk assessment with health care team. Call light within reach. Educated about call light use. Guards at bedside. Hourly rounding in practice.      Problem: Pain Management  Goal: Pain level will decrease to patient's comfort goal    Intervention: Follow pain managment plan developed in collaboration with patient and Interdisciplinary Team  Assessing pain level frequently using 0 to 10 scale.  Providing medication per MAR.  Providing non-pharmacological intervention, therapeutic communication.  Hourly rounding in practice.

## 2017-09-19 NOTE — PROGRESS NOTES
"RN answered pt call light, guards in room stated to RN that pt had turned over to his side and \"twisted over his ankle shackle and fell on the ground.\" Per pt and guards pt hit his left elbow and back only. Pt complaining of 3/10 left elbow pain and \"normal h/a pain,\" RN gave tylenol, no other neuro deficits noted from pt baseline. RN educated pt that he is no longer allowed to refuse bed alarm and placed bed alarm on pt. RN also reinforced call light use to pt when getting OOB and educated the guards to intervene if they see the pt attempting to get out of bed or notify RN with staff assist button if needed. APN Patricia and pharmacy notified of pt fall. Yellow socks on pt and yellow sign outside pt door for high fall risk notice. No other pt needs at this time.     /70   Pulse 89   Temp 37.1 °C (98.7 °F)   Resp 16   Ht 1.753 m (5' 9\")   Wt 85.5 kg (188 lb 7.9 oz)   SpO2 93%   BMI 27.84 kg/m²     "

## 2017-09-19 NOTE — CARE PLAN
Problem: Safety  Goal: Will remain free from injury    Intervention: Provide assistance with mobility  Pt. assisted up with standby to use the restroom, tolerates well. Steady on his feet. Because of previous fall, pt. Educated on alerting medical staff when needing to use the bathroom.       Problem: Pain Management  Goal: Pain level will decrease to patient's comfort goal  Outcome: PROGRESSING AS EXPECTED  Pt. States his headache is at a 2/10, states the Tylenol clears up his headache and that he is comfortable where he is.
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
## 2017-09-20 ENCOUNTER — HOSPITAL ENCOUNTER (INPATIENT)
Facility: REHABILITATION | Age: 37
End: 2017-09-20
Attending: PHYSICAL MEDICINE & REHABILITATION | Admitting: PHYSICAL MEDICINE & REHABILITATION

## 2017-09-20 LAB
ANION GAP SERPL CALC-SCNC: 9 MMOL/L (ref 0–11.9)
BUN SERPL-MCNC: 11 MG/DL (ref 8–22)
CALCIUM SERPL-MCNC: 9.5 MG/DL (ref 8.5–10.5)
CHLORIDE SERPL-SCNC: 98 MMOL/L (ref 96–112)
CO2 SERPL-SCNC: 29 MMOL/L (ref 20–33)
CREAT SERPL-MCNC: 0.72 MG/DL (ref 0.5–1.4)
GFR SERPL CREATININE-BSD FRML MDRD: >60 ML/MIN/1.73 M 2
GLUCOSE SERPL-MCNC: 115 MG/DL (ref 65–99)
POTASSIUM SERPL-SCNC: 3.9 MMOL/L (ref 3.6–5.5)
SODIUM SERPL-SCNC: 136 MMOL/L (ref 135–145)

## 2017-09-20 PROCEDURE — A9270 NON-COVERED ITEM OR SERVICE: HCPCS | Performed by: PHYSICAL MEDICINE & REHABILITATION

## 2017-09-20 PROCEDURE — A9270 NON-COVERED ITEM OR SERVICE: HCPCS | Performed by: NURSE PRACTITIONER

## 2017-09-20 PROCEDURE — 700102 HCHG RX REV CODE 250 W/ 637 OVERRIDE(OP): Performed by: SURGERY

## 2017-09-20 PROCEDURE — 80048 BASIC METABOLIC PNL TOTAL CA: CPT

## 2017-09-20 PROCEDURE — 770001 HCHG ROOM/CARE - MED/SURG/GYN PRIV*

## 2017-09-20 PROCEDURE — A9270 NON-COVERED ITEM OR SERVICE: HCPCS | Performed by: SURGERY

## 2017-09-20 PROCEDURE — 700112 HCHG RX REV CODE 229: Performed by: SURGERY

## 2017-09-20 PROCEDURE — 36415 COLL VENOUS BLD VENIPUNCTURE: CPT

## 2017-09-20 PROCEDURE — 700102 HCHG RX REV CODE 250 W/ 637 OVERRIDE(OP): Performed by: PHYSICAL MEDICINE & REHABILITATION

## 2017-09-20 PROCEDURE — 700102 HCHG RX REV CODE 250 W/ 637 OVERRIDE(OP): Performed by: NURSE PRACTITIONER

## 2017-09-20 RX ORDER — LEVETIRACETAM 500 MG/1
500 TABLET ORAL 2 TIMES DAILY
Qty: 60 TAB
Start: 2017-09-20

## 2017-09-20 RX ORDER — AMOXICILLIN 250 MG
1 CAPSULE ORAL DAILY
Qty: 30 TAB | Refills: 0
Start: 2017-09-20

## 2017-09-20 RX ORDER — PSEUDOEPHEDRINE HCL 30 MG
100 TABLET ORAL 2 TIMES DAILY
Qty: 60 CAP
Start: 2017-09-20

## 2017-09-20 RX ORDER — POLYETHYLENE GLYCOL 3350 17 G/17G
17 POWDER, FOR SOLUTION ORAL 2 TIMES DAILY
Refills: 3
Start: 2017-09-20

## 2017-09-20 RX ORDER — ACETAMINOPHEN 500 MG
1000 TABLET ORAL EVERY 6 HOURS
Qty: 30 TAB | Refills: 0
Start: 2017-09-20

## 2017-09-20 RX ORDER — ONDANSETRON 4 MG/1
4 TABLET, ORALLY DISINTEGRATING ORAL EVERY 4 HOURS PRN
Qty: 10 TAB | Refills: 0
Start: 2017-09-20

## 2017-09-20 RX ORDER — OXYCODONE HYDROCHLORIDE 5 MG/1
5 TABLET ORAL
Qty: 28 TAB
Start: 2017-09-20

## 2017-09-20 RX ORDER — BISACODYL 10 MG
10 SUPPOSITORY, RECTAL RECTAL
Refills: 0
Start: 2017-09-20

## 2017-09-20 RX ORDER — BUTALBITAL, ACETAMINOPHEN AND CAFFEINE 50; 325; 40 MG/1; MG/1; MG/1
1-2 TABLET ORAL EVERY 6 HOURS PRN
Status: DISCONTINUED | OUTPATIENT
Start: 2017-09-20 | End: 2017-09-22 | Stop reason: HOSPADM

## 2017-09-20 RX ORDER — DIVALPROEX SODIUM 250 MG/1
250 TABLET, DELAYED RELEASE ORAL 2 TIMES DAILY
Status: DISCONTINUED | OUTPATIENT
Start: 2017-09-20 | End: 2017-09-22 | Stop reason: HOSPADM

## 2017-09-20 RX ADMIN — MAGNESIUM HYDROXIDE 30 ML: 400 SUSPENSION ORAL at 09:00

## 2017-09-20 RX ADMIN — STANDARDIZED SENNA CONCENTRATE AND DOCUSATE SODIUM 1 TABLET: 8.6; 5 TABLET, FILM COATED ORAL at 20:31

## 2017-09-20 RX ADMIN — LEVETIRACETAM 500 MG: 250 TABLET, FILM COATED ORAL at 20:31

## 2017-09-20 RX ADMIN — OXYCODONE HYDROCHLORIDE 5 MG: 10 TABLET ORAL at 20:32

## 2017-09-20 RX ADMIN — OXYCODONE HYDROCHLORIDE 5 MG: 10 TABLET ORAL at 03:36

## 2017-09-20 RX ADMIN — ACETAMINOPHEN 1000 MG: 500 TABLET ORAL at 13:05

## 2017-09-20 RX ADMIN — DIVALPROEX SODIUM 250 MG: 250 TABLET, DELAYED RELEASE ORAL at 20:33

## 2017-09-20 RX ADMIN — ACETAMINOPHEN 500 MG: 500 TABLET ORAL at 05:28

## 2017-09-20 RX ADMIN — POLYETHYLENE GLYCOL 3350 1 PACKET: 17 POWDER, FOR SOLUTION ORAL at 20:33

## 2017-09-20 RX ADMIN — LEVETIRACETAM 500 MG: 250 TABLET, FILM COATED ORAL at 09:00

## 2017-09-20 RX ADMIN — DOCUSATE SODIUM 100 MG: 100 CAPSULE ORAL at 20:33

## 2017-09-20 RX ADMIN — DOCUSATE SODIUM 100 MG: 100 CAPSULE ORAL at 09:00

## 2017-09-20 ASSESSMENT — ENCOUNTER SYMPTOMS
DOUBLE VISION: 0
SENSORY CHANGE: 0
HEADACHES: 1
RESPIRATORY NEGATIVE: 1
MYALGIAS: 0
SHORTNESS OF BREATH: 0
CONSTITUTIONAL NEGATIVE: 1
NAUSEA: 0
PSYCHIATRIC NEGATIVE: 1
BLURRED VISION: 0
SPEECH CHANGE: 0
ROS GI COMMENTS: BM PRIOR TO ARRIVAL
MUSCULOSKELETAL NEGATIVE: 1
CARDIOVASCULAR NEGATIVE: 1
FEVER: 0
FOCAL WEAKNESS: 0
ABDOMINAL PAIN: 0

## 2017-09-20 ASSESSMENT — PAIN SCALES - GENERAL
PAINLEVEL_OUTOF10: 0
PAINLEVEL_OUTOF10: 2
PAINLEVEL_OUTOF10: 4
PAINLEVEL_OUTOF10: 2
PAINLEVEL_OUTOF10: 3
PAINLEVEL_OUTOF10: 2

## 2017-09-20 ASSESSMENT — COGNITIVE AND FUNCTIONAL STATUS - GENERAL
CLIMB 3 TO 5 STEPS WITH RAILING: A LITTLE
MOVING FROM LYING ON BACK TO SITTING ON SIDE OF FLAT BED: A LITTLE
MOVING TO AND FROM BED TO CHAIR: A LITTLE
MOBILITY SCORE: 20
SUGGESTED CMS G CODE MODIFIER MOBILITY: CJ
WALKING IN HOSPITAL ROOM: A LITTLE

## 2017-09-20 ASSESSMENT — LIFESTYLE VARIABLES: DO YOU DRINK ALCOHOL: NO

## 2017-09-20 NOTE — PROGRESS NOTES
Pt. States the reason he hasn't been eating is because he only wants to eat fruit. Diet order has been modified.
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
## 2017-09-20 NOTE — PROGRESS NOTES
Received report and assumed pt care.  A&O x4.  Bed alarm and treaded socks on.  Call light within reach.  No s/s of distress or pain.  Two guards in room.

## 2017-09-20 NOTE — DISCHARGE PLANNING
Medical Social Worker    JHONY received call from Kiley SWAIN who stated that long-term Doc does not want to accept him back as they do not have the proper room to facilitate pt's needs. They wish for him to transfer to Rehab or LTAC until he is closer to baseline.     JHONY informed bedside RN and TITUS Al.      Add:  Pt does not have LTAC or [facility] needs per TITUS Garcia. [facility]. Kiley gave JHONY the direct line to Dr. Corrales. JHONY gave this to Radha.

## 2017-09-20 NOTE — DISCHARGE PLANNING
Medical Social Worker    JHONY made call to Kiley SWAIN to inform her that pt is ready for DC, she asked that we fax to her:  FaceSheet, Med Rec, DC Summ, and most Recent CT.    JHONY faxed these to Kiley and will wait for long-term Doc to review and then Kiley will call JHONY back.

## 2017-09-20 NOTE — PROGRESS NOTES
Pt. A/Ox4, lethargic. Denies pain. Pt. Up to bathroom, a little unsteady on his feet. Tolerates 1-assist well. Pending d/c today. Security in place. Poc discussed, bed alarm on.
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
## 2017-09-20 NOTE — CARE PLAN
Problem: Safety  Goal: Will remain free from injury  Outcome: PROGRESSING AS EXPECTED  Pt. Needs to stand at edge of bed before attempting to mobilize. Is unsteady for the first few steps but once is walking he tolerates well.     Problem: Bowel/Gastric:  Goal: Normal bowel function is maintained or improved  Outcome: PROGRESSING AS EXPECTED  Pt. Had not had a bowel movement since prior to admission, pt. Encouraged to drink more fluids and given stool softeners. Pt. Had BM this AM.

## 2017-09-20 NOTE — CARE PLAN
Problem: Safety  Goal: Will remain free from injury  Two guards in the room.  RN in the room when shackles are taken off and to get out of bed.    Problem: Skin Integrity  Goal: Risk for impaired skin integrity will decrease  Mepilex applied to wrists and ankles to protect the skin from rubbing of shackles.
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
## 2017-09-20 NOTE — PROGRESS NOTES
Pt. Encouraged to sit up in bed, HOB raised. Lights turned on in room. Pt. Continues to refuse to eat or go for a walk.

## 2017-09-20 NOTE — DISCHARGE PLANNING
Thank you for the opportunity to assist with this patient as they transition to post acute services.  We are aware of the Rehab referral from ALAN Raymundo.  Dr. Moncada to consult this referral. Our Transitional Case Coordinator will follow. At this time patient is showing to have [employer] as their coverage.   Please do not hesitate to call us if you require additional assistance my phone number is [phone] Adonis.

## 2017-09-20 NOTE — PROGRESS NOTES
Notified by case management that [facility] is not accepting patient back as they do not have the proper room to facilitate pt's needs. They wish for him to transfer to Rehab or LTAC until he is closer to baseline.     [facility] and his concerns about the patient's transfer were discussed. Those concerns include the [facility]'s long distance from a medical facility capable of providing efficient care in case of an emergency,  patient's fall risk, mental status that is not yet at baseline, and his inability to perform ADLs independently.   Reportedly the [facility] medical ritter is equipped with only very basic supplies and does not allow for more complex medical interventions.  Dr Corrales was informed that a Rehab referral has been placed.     Of note, the bedside [facility] guards are apparently responsible for reporting on the patient's condition to their supervisors at the [facility]. As discussed with the two guards present in the room their reports are quite different from evaluations and assessments performed by our professional medical staff.   Clarifications and improved communication between guards and caregivers may provide for more amicable and efficient plan of care . Discussed with nursing.

## 2017-09-20 NOTE — DISCHARGE PLANNING
Aware of PMR referral from ALAN Al. Current chart documentation indicates potential for inpatient rehab. Will forward to Physiatry for consult per protocol. Dr. Nandini Moncada to review. Would appreciate initial OT eval and SLP Cog eval to assist with Physiatry consult. TCC to follow for Physiatry recommendation. Thank you for the opportunity to assist as this individual prepares for transition to post acute care.

## 2017-09-21 LAB
MAGNESIUM SERPL-MCNC: 2.3 MG/DL (ref 1.5–2.5)
PHOSPHATE SERPL-MCNC: 4.3 MG/DL (ref 2.5–4.5)

## 2017-09-21 PROCEDURE — 84100 ASSAY OF PHOSPHORUS: CPT

## 2017-09-21 PROCEDURE — A9270 NON-COVERED ITEM OR SERVICE: HCPCS | Performed by: SURGERY

## 2017-09-21 PROCEDURE — 83735 ASSAY OF MAGNESIUM: CPT

## 2017-09-21 PROCEDURE — 770001 HCHG ROOM/CARE - MED/SURG/GYN PRIV*

## 2017-09-21 PROCEDURE — 700112 HCHG RX REV CODE 229: Performed by: SURGERY

## 2017-09-21 PROCEDURE — A9270 NON-COVERED ITEM OR SERVICE: HCPCS | Performed by: PHYSICAL MEDICINE & REHABILITATION

## 2017-09-21 PROCEDURE — 700102 HCHG RX REV CODE 250 W/ 637 OVERRIDE(OP): Performed by: NURSE PRACTITIONER

## 2017-09-21 PROCEDURE — A9270 NON-COVERED ITEM OR SERVICE: HCPCS | Performed by: NURSE PRACTITIONER

## 2017-09-21 PROCEDURE — 36415 COLL VENOUS BLD VENIPUNCTURE: CPT

## 2017-09-21 PROCEDURE — 700102 HCHG RX REV CODE 250 W/ 637 OVERRIDE(OP): Performed by: SURGERY

## 2017-09-21 PROCEDURE — 700102 HCHG RX REV CODE 250 W/ 637 OVERRIDE(OP): Performed by: PHYSICAL MEDICINE & REHABILITATION

## 2017-09-21 RX ADMIN — DOCUSATE SODIUM 100 MG: 100 CAPSULE ORAL at 21:33

## 2017-09-21 RX ADMIN — BUTALBITAL, ACETAMINOPHEN, AND CAFFEINE 2 TABLET: 50; 325; 40 TABLET ORAL at 14:01

## 2017-09-21 RX ADMIN — BUTALBITAL, ACETAMINOPHEN, AND CAFFEINE 2 TABLET: 50; 325; 40 TABLET ORAL at 00:43

## 2017-09-21 RX ADMIN — BUTALBITAL, ACETAMINOPHEN, AND CAFFEINE 2 TABLET: 50; 325; 40 TABLET ORAL at 21:36

## 2017-09-21 RX ADMIN — STANDARDIZED SENNA CONCENTRATE AND DOCUSATE SODIUM 1 TABLET: 8.6; 5 TABLET, FILM COATED ORAL at 21:33

## 2017-09-21 RX ADMIN — DIVALPROEX SODIUM 250 MG: 250 TABLET, DELAYED RELEASE ORAL at 21:33

## 2017-09-21 RX ADMIN — LEVETIRACETAM 500 MG: 250 TABLET, FILM COATED ORAL at 08:00

## 2017-09-21 RX ADMIN — MAGNESIUM HYDROXIDE 30 ML: 400 SUSPENSION ORAL at 08:00

## 2017-09-21 RX ADMIN — OXYCODONE HYDROCHLORIDE 5 MG: 10 TABLET ORAL at 08:02

## 2017-09-21 RX ADMIN — BUTALBITAL, ACETAMINOPHEN, AND CAFFEINE 2 TABLET: 50; 325; 40 TABLET ORAL at 08:01

## 2017-09-21 RX ADMIN — LEVETIRACETAM 500 MG: 250 TABLET, FILM COATED ORAL at 21:33

## 2017-09-21 RX ADMIN — DIVALPROEX SODIUM 250 MG: 250 TABLET, DELAYED RELEASE ORAL at 08:01

## 2017-09-21 RX ADMIN — DOCUSATE SODIUM 100 MG: 100 CAPSULE ORAL at 08:01

## 2017-09-21 RX ADMIN — POLYETHYLENE GLYCOL 3350 1 PACKET: 17 POWDER, FOR SOLUTION ORAL at 08:00

## 2017-09-21 ASSESSMENT — ENCOUNTER SYMPTOMS
CARDIOVASCULAR NEGATIVE: 1
DIZZINESS: 0
SHORTNESS OF BREATH: 0
BACK PAIN: 1
BLURRED VISION: 0
GASTROINTESTINAL NEGATIVE: 1
MYALGIAS: 1
FEVER: 0
SENSORY CHANGE: 0
NAUSEA: 0
DOUBLE VISION: 0
HEADACHES: 1
ABDOMINAL PAIN: 0
CONSTIPATION: 0
FOCAL WEAKNESS: 0
ROS GI COMMENTS: BM 9/20
RESPIRATORY NEGATIVE: 1

## 2017-09-21 ASSESSMENT — PAIN SCALES - GENERAL
PAINLEVEL_OUTOF10: 4
PAINLEVEL_OUTOF10: 0
PAINLEVEL_OUTOF10: 3
PAINLEVEL_OUTOF10: 0
PAINLEVEL_OUTOF10: ASSUMED PAIN PRESENT
PAINLEVEL_OUTOF10: 0

## 2017-09-21 NOTE — DISCHARGE PLANNING
Medical Social Worker    JHONY spoke to Dr. Jimenez with [facility]. JHONY explained that pt is not appropriate for LTAC and that [facility] declined, and that we cannot set up SNF, it would be up to the alf to do that. He understands and stated that he would work on getting transport set up so that he can be transported to a different [facility] that can provide proper care to him.     JHONY also explained the many problems that we have been having with the alf guards while the pt is here.  stated that he would escalate this and have them taken care of.

## 2017-09-21 NOTE — PROGRESS NOTES
Received report and assumed pt care.  .  Bed alarm and treaded socks on.  Call light within reach.  No s/s of distress or pain.  2 guards in the room.

## 2017-09-21 NOTE — CARE PLAN
Problem: Safety  Goal: Will remain free from falls    Intervention: Implement fall precautions  Bed alarm in use. Call light w/in reach. Instructed pt to call for assistance      Problem: Pain Management  Goal: Pain level will decrease to patient's comfort goal    Intervention: Follow pain managment plan developed in collaboration with patient and Interdisciplinary Team  Fioricet and oxy given with +results.

## 2017-09-21 NOTE — DISCHARGE PLANNING
[place], [facility], [place], and [facility] have declined patient due to criminal record.
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
## 2017-09-21 NOTE — PROGRESS NOTES
Pt is lethargic, arouses easily, oriented x4. Pt impulsive, irritable at times, forgetful. Discussed with pt and guards pt needs to have lights on during the day and be up for all meals. Pt up for breakfast. Ambulated in hallways with staff and guards. WANG 5/5. Denies N/T. Denies N/V. Up w/ SBA, steady gait with FWW in hallways. Pt c/o head/ R hip pain, oxy and Fioricet given with +results. Good appetite. Voiding w/o difficulty. Reviewed poc with pt and guards-verbalized understanding. Bed alarm in use. Call light in reach.

## 2017-09-21 NOTE — DISCHARGE PLANNING
Received choice form from Christy). Referral sent to all [facility].

## 2017-09-21 NOTE — DISCHARGE PLANNING
Medical Social Worker    JHONY is aware that [facility] has declined due to not having needs for Rehab. JHONY informed Kiley. JHONY stated he would send out blanket referral. JHONY spoke to [facility] who stated that it is up to the skilled nursing to set up SNF. JHONY will inform Kiley.

## 2017-09-21 NOTE — DISCHARGE PLANNING
Per Dr. Moncada consult:    He does not need acute inpatient rehabilitation as he is too functional with physical and occupational therapy. He needs cognitive therapy as well as time for his brain contusions to heal.

## 2017-09-21 NOTE — PROGRESS NOTES
Pt sleeping most of the day. Medicated for HA this afternoon, assisted pt up to chair. Discussed with pt to try to sit up for an hour. Pt only sat up for approx 2 minutes before going back to bed.

## 2017-09-22 VITALS
RESPIRATION RATE: 18 BRPM | TEMPERATURE: 97.4 F | OXYGEN SATURATION: 96 % | HEIGHT: 69 IN | DIASTOLIC BLOOD PRESSURE: 72 MMHG | WEIGHT: 188.49 LBS | BODY MASS INDEX: 27.92 KG/M2 | HEART RATE: 90 BPM | SYSTOLIC BLOOD PRESSURE: 106 MMHG

## 2017-09-22 PROCEDURE — A9270 NON-COVERED ITEM OR SERVICE: HCPCS | Performed by: PHYSICAL MEDICINE & REHABILITATION

## 2017-09-22 PROCEDURE — A9270 NON-COVERED ITEM OR SERVICE: HCPCS | Performed by: NURSE PRACTITIONER

## 2017-09-22 PROCEDURE — 700102 HCHG RX REV CODE 250 W/ 637 OVERRIDE(OP): Performed by: PHYSICAL MEDICINE & REHABILITATION

## 2017-09-22 PROCEDURE — 700102 HCHG RX REV CODE 250 W/ 637 OVERRIDE(OP): Performed by: NURSE PRACTITIONER

## 2017-09-22 PROCEDURE — 700102 HCHG RX REV CODE 250 W/ 637 OVERRIDE(OP): Performed by: SURGERY

## 2017-09-22 PROCEDURE — 700112 HCHG RX REV CODE 229: Performed by: SURGERY

## 2017-09-22 PROCEDURE — A9270 NON-COVERED ITEM OR SERVICE: HCPCS | Performed by: SURGERY

## 2017-09-22 RX ADMIN — POLYETHYLENE GLYCOL 3350 1 PACKET: 17 POWDER, FOR SOLUTION ORAL at 08:26

## 2017-09-22 RX ADMIN — BUTALBITAL, ACETAMINOPHEN, AND CAFFEINE 2 TABLET: 50; 325; 40 TABLET ORAL at 16:00

## 2017-09-22 RX ADMIN — DIVALPROEX SODIUM 250 MG: 250 TABLET, DELAYED RELEASE ORAL at 08:26

## 2017-09-22 RX ADMIN — MAGNESIUM HYDROXIDE 30 ML: 400 SUSPENSION ORAL at 08:26

## 2017-09-22 RX ADMIN — LEVETIRACETAM 500 MG: 250 TABLET, FILM COATED ORAL at 08:26

## 2017-09-22 RX ADMIN — DOCUSATE SODIUM 100 MG: 100 CAPSULE ORAL at 08:26

## 2017-09-22 RX ADMIN — BUTALBITAL, ACETAMINOPHEN, AND CAFFEINE 2 TABLET: 50; 325; 40 TABLET ORAL at 06:41

## 2017-09-22 ASSESSMENT — ENCOUNTER SYMPTOMS
BLURRED VISION: 0
HEADACHES: 1
RESPIRATORY NEGATIVE: 1
DOUBLE VISION: 0
FOCAL WEAKNESS: 0
SHORTNESS OF BREATH: 0
NAUSEA: 0
GASTROINTESTINAL NEGATIVE: 1
ROS GI COMMENTS: BM 9/20
FEVER: 0
MYALGIAS: 1
ABDOMINAL PAIN: 0
CARDIOVASCULAR NEGATIVE: 1
SENSORY CHANGE: 0
CONSTIPATION: 0
DIZZINESS: 0
BACK PAIN: 1

## 2017-09-22 ASSESSMENT — PAIN SCALES - GENERAL: PAINLEVEL_OUTOF10: 9

## 2017-09-22 ASSESSMENT — LIFESTYLE VARIABLES: EVER_SMOKED: YES

## 2017-09-22 NOTE — PROGRESS NOTES
Received report and assumed pt care.  A&O x4.  Bed alarm and treaded socks on.  Call light within reach.  No s/s of distress or pain.  2 guards in room.

## 2017-09-22 NOTE — PROGRESS NOTES
Pt hallucinating this afternoon. Pt does answer orientation question appropriately when asked. Pt able to state that he is in the hospital and why he is here. Message left for Radha QURESHI.

## 2017-09-22 NOTE — DISCHARGE PLANNING
Medical Social Work     JHONY received a call from Kiley who is with [facility] she can be contact at [phone] ext [phone]. Kiley stated that they found a facility to take the pt. The accepting facility is [facility]. Lilibethcelia with [facility] can be contacted at [phone] ext [phone] if we have any questions. JHONY called trauma [facility] and requested an addendum to the discharge summary. [facility] will do the addendum and stated nothing has changed medically for the pt.     Bedside RN is aware of the pt discharging and transfering to [facility].

## 2017-09-22 NOTE — CARE PLAN
Problem: Safety  Goal: Will remain free from falls  Educated guards and pt about importance of bed alarm and that guards should not be turning the bed alarm off when pt sets off alarm.    Problem: Pain Management  Goal: Pain level will decrease to patient's comfort goal  Pt calls when he needs pain medication for his headache.  Medication provided.

## 2017-09-22 NOTE — CARE PLAN
Problem: Safety  Goal: Will remain free from falls  Outcome: PROGRESSING AS EXPECTED      Problem: Bowel/Gastric:  Goal: Normal bowel function is maintained or improved  Outcome: PROGRESSING SLOWER THAN EXPECTED

## 2017-09-22 NOTE — PROGRESS NOTES
Called [facility] in attempt to give report, no answer. Will attempt at another time.
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
## 2017-09-23 NOTE — DISCHARGE INSTRUCTIONS
Discharge Instructions  The patient will be discharged to [facility] under the care and supervision of medical transport team and intermediate guards on 9/22/2017. He will follow up with Dr. White in 2 weeks. Special attention was paid to worsening unrelieved headache, changes in mentation, nausea, vomiting, dizziness, fever, and signs and symptoms of infection and to seek immediate medical attention if these develop.    Discharged to other by correctional transport with escort. Discharged via wheelchair, hospital escort: Refused.  Special equipment needed: Not Applicable    Be sure to schedule a follow-up appointment with your primary care doctor or any specialists as instructed.     Discharge Plan:        I understand that a diet low in cholesterol, fat, and sodium is recommended for good health. Unless I have been given specific instructions below for another diet, I accept this instruction as my diet prescription.   Other diet: Regular    Special Instructions: None    · Is patient discharged on Warfarin / Coumadin?   No     · Is patient Post Blood Transfusion?  No    Depression / Suicide Risk    As you are discharged from this [facility] facility, it is important to learn how to keep safe from harming yourself.    Recognize the warning signs:  · Abrupt changes in personality, positive or negative- including increase in energy   · Giving away possessions  · Change in eating patterns- significant weight changes-  positive or negative  · Change in sleeping patterns- unable to sleep or sleeping all the time   · Unwillingness or inability to communicate  · Depression  · Unusual sadness, discouragement and loneliness  · Talk of wanting to die  · Neglect of personal appearance   · Rebelliousness- reckless behavior  · Withdrawal from people/activities they love  · Confusion- inability to concentrate     If you or a loved one observes any of these behaviors or has concerns about self-harm, here's what  you can do:  · Talk about it- your feelings and reasons for harming yourself  · Remove any means that you might use to hurt yourself (examples: pills, rope, extension cords, firearm)  · Get professional help from the community (Mental Health, Substance Abuse, psychological counseling)  · Do not be alone:Call your Safe Contact- someone whom you trust who will be there for you.  · Call your local CRISIS HOTLINE 216-0220 or 461-695-2273  · Call your local Children's Mobile Crisis Response Team [place] (674) 868-2576 or www.[facility]  · Call the toll free National Suicide Prevention Hotlines   · National Suicide Prevention Lifeline 343-489-FYON (5116)  · National Hope Line Network 800-SUICIDE (815-3288)

## 2017-09-23 NOTE — PROGRESS NOTES
Pt d/c via hospital transport with skilled nursing transport at side. Clean clothes provided by skilled nursing. No IV in place. No changes in neuro status. Packet from  given to guards. Pt not in distress.
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
